# Patient Record
Sex: MALE | Race: WHITE | NOT HISPANIC OR LATINO | Employment: OTHER | URBAN - METROPOLITAN AREA
[De-identification: names, ages, dates, MRNs, and addresses within clinical notes are randomized per-mention and may not be internally consistent; named-entity substitution may affect disease eponyms.]

---

## 2017-04-08 PROBLEM — R07.9 CHEST PAIN: Status: ACTIVE | Noted: 2017-04-08

## 2017-04-09 PROBLEM — I20.0 PROGRESSIVE ANGINA: Status: ACTIVE | Noted: 2017-04-09

## 2017-04-09 PROBLEM — R07.9 CHEST PAIN: Status: ACTIVE | Noted: 2017-04-09

## 2017-04-09 PROBLEM — R07.9 CHEST PAIN: Status: RESOLVED | Noted: 2017-04-08 | Resolved: 2017-04-09

## 2017-09-08 PROBLEM — I20.9 ANGINA, CLASS III: Status: ACTIVE | Noted: 2017-09-08

## 2017-09-09 PROBLEM — I20.9 ANGINA, CLASS III: Status: RESOLVED | Noted: 2017-09-08 | Resolved: 2017-09-09

## 2017-12-19 PROBLEM — R94.39 ABNORMAL CARDIOVASCULAR STRESS TEST: Status: ACTIVE | Noted: 2017-12-19

## 2017-12-19 PROBLEM — I20.9 ANGINA, CLASS III: Status: ACTIVE | Noted: 2017-12-19

## 2017-12-20 PROBLEM — I50.33 ACUTE ON CHRONIC DIASTOLIC CONGESTIVE HEART FAILURE: Status: ACTIVE | Noted: 2017-12-20

## 2018-02-20 PROBLEM — I20.9 ANGINA, CLASS IV: Status: ACTIVE | Noted: 2018-02-20

## 2018-02-20 PROBLEM — I20.0 UNSTABLE ANGINA: Status: ACTIVE | Noted: 2018-02-20

## 2018-05-21 PROBLEM — I20.0 UNSTABLE ANGINA PECTORIS: Status: ACTIVE | Noted: 2018-05-21

## 2018-05-22 PROBLEM — I20.9 ANGINA PECTORIS: Status: ACTIVE | Noted: 2017-04-09

## 2018-06-25 PROBLEM — R07.9 CHEST PAIN: Status: ACTIVE | Noted: 2018-06-25

## 2018-11-12 PROBLEM — R09.02 HYPOXIA: Status: ACTIVE | Noted: 2018-11-12

## 2018-11-13 PROBLEM — I48.91 ATRIAL FIBRILLATION: Status: ACTIVE | Noted: 2018-11-13

## 2018-11-15 PROBLEM — J96.01 ACUTE HYPOXEMIC RESPIRATORY FAILURE: Status: ACTIVE | Noted: 2018-11-12

## 2018-11-16 PROBLEM — S31.109A OPEN WOUND ANTERIOR ABDOMINAL WALL: Status: ACTIVE | Noted: 2018-11-16

## 2018-11-17 PROBLEM — I20.9 ANGINA PECTORIS: Status: RESOLVED | Noted: 2017-04-09 | Resolved: 2018-11-17

## 2018-11-17 PROBLEM — I48.91 ATRIAL FIBRILLATION: Status: RESOLVED | Noted: 2018-11-13 | Resolved: 2018-11-17

## 2018-11-17 PROBLEM — R94.39 ABNORMAL CARDIOVASCULAR STRESS TEST: Status: RESOLVED | Noted: 2017-12-19 | Resolved: 2018-11-17

## 2018-11-17 PROBLEM — J96.01 ACUTE HYPOXEMIC RESPIRATORY FAILURE: Status: RESOLVED | Noted: 2018-11-12 | Resolved: 2018-11-17

## 2019-08-28 ENCOUNTER — HOSPITAL ENCOUNTER (EMERGENCY)
Facility: HOSPITAL | Age: 69
Discharge: HOME | End: 2019-08-28
Attending: EMERGENCY MEDICINE
Payer: MEDICARE

## 2019-08-28 VITALS
OXYGEN SATURATION: 100 % | TEMPERATURE: 98 F | HEART RATE: 54 BPM | WEIGHT: 232.5 LBS | RESPIRATION RATE: 18 BRPM | BODY MASS INDEX: 31.49 KG/M2 | DIASTOLIC BLOOD PRESSURE: 62 MMHG | HEIGHT: 72 IN | SYSTOLIC BLOOD PRESSURE: 148 MMHG

## 2019-08-28 DIAGNOSIS — R00.1 SINUS BRADYCARDIA: ICD-10-CM

## 2019-08-28 DIAGNOSIS — R42 EPISODIC LIGHTHEADEDNESS: Primary | ICD-10-CM

## 2019-08-28 DIAGNOSIS — R42 DIZZINESS: ICD-10-CM

## 2019-08-28 LAB
ALBUMIN SERPL BCP-MCNC: 3.8 G/DL (ref 3.5–5.2)
ALP SERPL-CCNC: 115 U/L (ref 55–135)
ALT SERPL W/O P-5'-P-CCNC: 26 U/L (ref 10–44)
ANION GAP SERPL CALC-SCNC: 10 MMOL/L (ref 8–16)
AST SERPL-CCNC: 27 U/L (ref 10–40)
BASOPHILS # BLD AUTO: 0.02 K/UL (ref 0–0.2)
BASOPHILS NFR BLD: 0.3 % (ref 0–1.9)
BILIRUB SERPL-MCNC: 0.3 MG/DL (ref 0.1–1)
BILIRUB UR QL STRIP: NEGATIVE
BNP SERPL-MCNC: 168 PG/ML (ref 0–99)
BUN SERPL-MCNC: 20 MG/DL (ref 8–23)
CALCIUM SERPL-MCNC: 9.1 MG/DL (ref 8.7–10.5)
CHLORIDE SERPL-SCNC: 106 MMOL/L (ref 95–110)
CK SERPL-CCNC: 247 U/L (ref 20–200)
CLARITY UR: CLEAR
CO2 SERPL-SCNC: 23 MMOL/L (ref 23–29)
COLOR UR: YELLOW
CREAT SERPL-MCNC: 1 MG/DL (ref 0.5–1.4)
DIFFERENTIAL METHOD: ABNORMAL
EOSINOPHIL # BLD AUTO: 0.3 K/UL (ref 0–0.5)
EOSINOPHIL NFR BLD: 4.3 % (ref 0–8)
ERYTHROCYTE [DISTWIDTH] IN BLOOD BY AUTOMATED COUNT: 16.5 % (ref 11.5–14.5)
EST. GFR  (AFRICAN AMERICAN): >60 ML/MIN/1.73 M^2
EST. GFR  (NON AFRICAN AMERICAN): >60 ML/MIN/1.73 M^2
GLUCOSE SERPL-MCNC: 80 MG/DL (ref 70–110)
GLUCOSE UR QL STRIP: NEGATIVE
HCT VFR BLD AUTO: 40.7 % (ref 40–54)
HGB BLD-MCNC: 13.4 G/DL (ref 14–18)
HGB UR QL STRIP: NEGATIVE
KETONES UR QL STRIP: NEGATIVE
LEUKOCYTE ESTERASE UR QL STRIP: NEGATIVE
LYMPHOCYTES # BLD AUTO: 1.1 K/UL (ref 1–4.8)
LYMPHOCYTES NFR BLD: 18.7 % (ref 18–48)
MCH RBC QN AUTO: 26.6 PG (ref 27–31)
MCHC RBC AUTO-ENTMCNC: 32.9 G/DL (ref 32–36)
MCV RBC AUTO: 81 FL (ref 82–98)
MONOCYTES # BLD AUTO: 0.6 K/UL (ref 0.3–1)
MONOCYTES NFR BLD: 10 % (ref 4–15)
NEUTROPHILS # BLD AUTO: 4.1 K/UL (ref 1.8–7.7)
NEUTROPHILS NFR BLD: 66.7 % (ref 38–73)
NITRITE UR QL STRIP: NEGATIVE
PH UR STRIP: 6 [PH] (ref 5–8)
PLATELET # BLD AUTO: 205 K/UL (ref 150–350)
PMV BLD AUTO: 9.4 FL (ref 9.2–12.9)
POTASSIUM SERPL-SCNC: 4.2 MMOL/L (ref 3.5–5.1)
PROT SERPL-MCNC: 7 G/DL (ref 6–8.4)
PROT UR QL STRIP: NEGATIVE
RBC # BLD AUTO: 5.04 M/UL (ref 4.6–6.2)
SODIUM SERPL-SCNC: 139 MMOL/L (ref 136–145)
SP GR UR STRIP: >=1.03 (ref 1–1.03)
TROPONIN I SERPL DL<=0.01 NG/ML-MCNC: 0.01 NG/ML (ref 0–0.03)
URN SPEC COLLECT METH UR: ABNORMAL
UROBILINOGEN UR STRIP-ACNC: NEGATIVE EU/DL
WBC # BLD AUTO: 6.1 K/UL (ref 3.9–12.7)

## 2019-08-28 PROCEDURE — 93005 ELECTROCARDIOGRAM TRACING: CPT

## 2019-08-28 PROCEDURE — 82550 ASSAY OF CK (CPK): CPT

## 2019-08-28 PROCEDURE — 99285 EMERGENCY DEPT VISIT HI MDM: CPT | Mod: 25

## 2019-08-28 PROCEDURE — 83880 ASSAY OF NATRIURETIC PEPTIDE: CPT

## 2019-08-28 PROCEDURE — 80053 COMPREHEN METABOLIC PANEL: CPT

## 2019-08-28 PROCEDURE — 85025 COMPLETE CBC W/AUTO DIFF WBC: CPT

## 2019-08-28 PROCEDURE — 84484 ASSAY OF TROPONIN QUANT: CPT

## 2019-08-28 PROCEDURE — 96360 HYDRATION IV INFUSION INIT: CPT

## 2019-08-28 PROCEDURE — 93010 ELECTROCARDIOGRAM REPORT: CPT | Mod: ,,, | Performed by: INTERNAL MEDICINE

## 2019-08-28 PROCEDURE — 81003 URINALYSIS AUTO W/O SCOPE: CPT

## 2019-08-28 PROCEDURE — 63600175 PHARM REV CODE 636 W HCPCS: Performed by: EMERGENCY MEDICINE

## 2019-08-28 PROCEDURE — 93010 EKG 12-LEAD: ICD-10-PCS | Mod: ,,, | Performed by: INTERNAL MEDICINE

## 2019-08-28 RX ADMIN — SODIUM CHLORIDE 500 ML: 0.9 INJECTION, SOLUTION INTRAVENOUS at 06:08

## 2019-08-28 NOTE — ED NOTES
Pt lying in bed comfortably. AAO x 4. Resp even and unlabored with equal chest rise and fall. Skin warm and dry. 20G PIV noted to R forearm. Flushes well, drg CDI. Side rails up x 2. Call light within reach. No distress noted. Pt denies any needs or assist at this time.

## 2019-08-28 NOTE — ED NOTES
Pt presents to ED today with c/o dizziness x3 hours. Pt describes symptoms as feeling lightheaded +spinning when he is lying down. Associated symptoms include nausea and HA. Patient denies any chest pain, fever, chills, sore throat, cough, diarrhea, SOB, HA, syncope, weakness, numbness, seizures, and all other sxs at this time.    Patient identifiers verified and correct for Laz Rj.    Level of Consciousness: The patient is awake, alert and aware of environment with an appropriate affect, the patient is oriented x 3 and speaking appropriately.  Appearance: Patient resting comfortably and in no acute distress, patient is clean and well groomed, patient's clothing is properly fastened.  Skin: The skin is warm and dry, color consistent with ethnicity, patient has normal skin turgor and moist mucus membranes, skin intact, no breakdown or bruising noted.  Musculoskeletal: Moves all extremities well in full range of motion. No obvious deformities or swelling noted. +generalized weakness  Respiratory: Airway open and patent, respirations spontaneous, even and unlabored. No accessory muscles in use. Breath sounds clear & equal  Cardiac: Patient is bradycardic, no periphreal edema noted, capillary refill < 3 seconds. good pulses palpated peripherally. Denies chest pain  Abdomen: Soft, non-tender to palpation. No distention noted. +nausea  Neurologic: PERRLA, face exhibits symmetrical expression, hand grasps equal and even bilaterally, reports normal sensation to all extremities and face.  Psychosocial: Normal affect. Good eye contact. Appropriate in content.  HEENT: +dizziness while laying flat +HA    Patient verbalized understanding of status and plan of care. Patient changed into hospital gown with assistance. Side rails up x 2, call light in reach, bed low and locked. Cardiac monitor applied to patient; alarms on & audible. WCTM.

## 2019-08-28 NOTE — ED PROVIDER NOTES
"SCRIBE #1 NOTE: I, Sb Jeffrey, am scribing for, and in the presence of, Sanna Martinez MD. I have scribed the entire note.       History     Chief Complaint   Patient presents with    Dizziness     Light-headed x3 hours. +HA/nausea. Triple bypass 10 months ago.     Review of patient's allergies indicates:  No Known Allergies      History of Present Illness     HPI    8/28/2019, 4:52 PM  History obtained from the patient      History of Present Illness: Laz De La Rosa is a 69 y.o. male patient with a PMHx of HTN who presents to the Emergency Department for evaluation of light-headedness which onset gradually 3 hours ago at home after eating lunch out. Symptoms are constant and moderate in severity. No mitigating or exacerbating factors reported. No associated sxs reported. Patient denies any SOB, abd pain, n/v, extremity weakness, dizziness, facial asymmetry, HA, speech difficulty, and all other sxs at this time. No prior Tx reported. Pt reports an episode 4 years ago with similar sxs as a result of low blood pressure. Pt denies any recent changes to his medications and any new activities. Pt states that he "sweat a lot" yesterday after working outside. No further complaints or concerns at this time.     Discrepancy noted between triage note and physician interview- pt denied any nausea and HA in physician interview.      Arrival mode: Personal vehicle     PCP: Vance Cleveland MD        Past Medical History:  Past Medical History:   Diagnosis Date    Anemia     BPH (benign prostatic hyperplasia)     Carotid artery disease     Coronary artery disease     Depression     GERD (gastroesophageal reflux disease)     Hemorrhoid     Hernia of abdominal wall     [place] (hard of hearing)     Hypercholesteremia     Hypertension     Wears hearing aid in both ears        Past Surgical History:  Past Surgical History:   Procedure Laterality Date    ANGIOGRAM, CORONARY ARTERY N/A 10/31/2018    Performed by " Shaka Sandoval MD at [facility] C[facility]    ANGIOGRAM-CORONARY N/A 4/10/2017    Performed by Viktor Villatoro MD at [facility] CATH    CARDIAC CATHETERIZATION      CARDIAC SURGERY      CORONARY ANGIOPLASTY      s/p PCI D1, pLAD, mLCx. PTCA D1 ISR    CORONARY ARTERY BYPASS GRAFT (CABG) N/A 11/7/2018    Performed by Andre Perez MD at [facility]    CORONARY STENT PLACEMENT      16 STENTS    GANGLION CYST EXCISION      HEART CATH-LEFT Right 9/8/2017    Performed [facility] Sewell [facility] at [facility]    HEART CATH-LEFT Left 11/1/2016    Per[facility]ell [facility] at [facility]    HEART CATH-LEFT Right 10/25/2016    Performed by [facility]ell [facility] at [facility]    HERNIA REPAIR      Left heart cath Left 6/26/2018    Performed by Viktor Villatoro MD at [facility] CATH    Left heart cath Left 5/22/2018    Performed by Sahil Shaikh MD at [facility] CATH    Left heart cath Left 2/20/2018    Performed by Michael Villatoro MD at [facility] CATH    Left heart cath Right 12/19/2017    Performed by [facility]ell MD at [facility] C[facility]    SURGICAL PROCUREMENT, VEIN, ENDOSCOPIC Right 11/7/2018    Performed by Andre Perez MD at [facility] OR    TONSILLECTOMY           Family History:  Family History   Problem Relation Age of Onset    Heart disease Father         MI    Heart disease Sister         ENLARGED HEART       Social History:  Social History     Tobacco Use    Smoking status: Former Smoker    Smokeless tobacco: Never Used    Tobacco comment: Quit x 40 years ago   Substance and Sexual Activity    Alcohol use: No     Comment: socially    Drug use: No    Sexual activity: Never        Review of Systems     Review of Systems   Constitutional: Negative for fever.   HENT: Negative for sore throat.    Respiratory: Negative for shortness of breath.    Cardiovascular: Negative for chest pain.   Gastrointestinal: Negative for abdominal pain, nausea and vomiting.   Genitourinary: Negative for dysuria.    Musculoskeletal: Negative for back pain.   Skin: Negative for rash.   Neurological: Positive for light-headedness. Negative for dizziness, facial asymmetry, speech difficulty, weakness and headaches.   Hematological: Does not bruise/bleed easily.   All other systems reviewed and are negative.       Physical Exam     Initial Vitals [08/28/19 1642]   BP Pulse Resp Temp SpO2   129/72 (!) 57 16 98.6 °F (37 °C) 96 %      MAP       --          Physical Exam  Nursing Notes and Vital Signs Reviewed.  Constitutional: Patient is in no acute distress. Well-developed and well-nourished.  Head: Atraumatic. Normocephalic.  Eyes: PERRL. EOM intact. Conjunctivae are not pale. No scleral icterus.  ENT: Mucous membranes are moist. Oropharynx is clear and symmetric.    Neck: Supple. Full ROM. No lymphadenopathy.  Cardiovascular: Regular rate. Regular rhythm. No murmurs, rubs, or gallops. Distal pulses are 2+ and symmetric.  Pulmonary/Chest: No respiratory distress. Clear to auscultation bilaterally. No wheezing or rales.  Abdominal: Soft and non-distended.  There is no tenderness.  No rebound, guarding, or rigidity. Good bowel sounds.  Genitourinary: No CVA tenderness  Musculoskeletal: Moves all extremities. No obvious deformities. No edema. No calf tenderness.  Skin: Warm and dry.  Neurological: Patient is alert and oriented to person, place and time. Pupils ERRL and EOM normal. Cranial nerves II-XII are intact. Strength is full bilaterally; it is equal and 5/5 in bilateral upper and lower extremities. There is no pronator drift of outstretched arms. Light touch sense is intact. Speech is clear and normal. No acute focal neurological deficits noted.  Psychiatric: Normal affect. Good eye contact. Appropriate in content.     ED Course   Procedures  ED Vital Signs:  Vitals:    08/28/19 1641 08/28/19 1642 08/28/19 1659 08/28/19 1702   BP:  129/72  135/71   Pulse:  (!) 57 (!) 56 (!) 53   Resp:  16     Temp:  98.6 °F (37 °C)     TempSrc:   Oral     SpO2:  96%     Weight: 105.4 kg (232 lb 7.6 oz)      Height: 6' (1.829 m)       08/28/19 1703 08/28/19 1704 08/28/19 1706 08/28/19 1801   BP: 136/78 (!) 147/87 (!) 147/87 123/66   Pulse: 60 62 62 (!) 51   Resp:   16 16   Temp:       TempSrc:       SpO2:   97% 96%   Weight:       Height:           Abnormal Lab Results:  Labs Reviewed   CBC W/ AUTO DIFFERENTIAL - Abnormal; Notable for the following components:       Result Value    Hemoglobin 13.4 (*)     Mean Corpuscular Volume 81 (*)     Mean Corpuscular Hemoglobin 26.6 (*)     RDW 16.5 (*)     All other components within normal limits   B-TYPE NATRIURETIC PEPTIDE - Abnormal; Notable for the following components:     (*)     All other components within normal limits   URINALYSIS, REFLEX TO URINE CULTURE - Abnormal; Notable for the following components:    Specific Gravity, UA >=1.030 (*)     All other components within normal limits    Narrative:     Preferred Collection Type->Urine, Clean Catch   CK - Abnormal; Notable for the following components:     (*)     All other components within normal limits   COMPREHENSIVE METABOLIC PANEL   TROPONIN I        All Lab Results:  Results for orders placed or performed during the hospital encounter of 08/28/19   CBC auto differential   Result Value Ref Range    WBC 6.10 3.90 - 12.70 K/uL    RBC 5.04 4.60 - 6.20 M/uL    Hemoglobin 13.4 (L) 14.0 - 18.0 g/dL    Hematocrit 40.7 40.0 - 54.0 %    Mean Corpuscular Volume 81 (L) 82 - 98 fL    Mean Corpuscular Hemoglobin 26.6 (L) 27.0 - 31.0 pg    Mean Corpuscular Hemoglobin Conc 32.9 32.0 - 36.0 g/dL    RDW 16.5 (H) 11.5 - 14.5 %    Platelets 205 150 - 350 K/uL    MPV 9.4 9.2 - 12.9 fL    Gran # (ANC) 4.1 1.8 - 7.7 K/uL    Lymph # 1.1 1.0 - 4.8 K/uL    Mono # 0.6 0.3 - 1.0 K/uL    Eos # 0.3 0.0 - 0.5 K/uL    Baso # 0.02 0.00 - 0.20 K/uL    Gran% 66.7 38.0 - 73.0 %    Lymph% 18.7 18.0 - 48.0 %    Mono% 10.0 4.0 - 15.0 %    Eosinophil% 4.3 0.0 - 8.0 %     Basophil% 0.3 0.0 - 1.9 %    Differential Method Automated    Comprehensive metabolic panel   Result Value Ref Range    Sodium 139 136 - 145 mmol/L    Potassium 4.2 3.5 - 5.1 mmol/L    Chloride 106 95 - 110 mmol/L    CO2 23 23 - 29 mmol/L    Glucose 80 70 - 110 mg/dL    BUN, Bld 20 8 - 23 mg/dL    Creatinine 1.0 0.5 - 1.4 mg/dL    Calcium 9.1 8.7 - 10.5 mg/dL    Total Protein 7.0 6.0 - 8.4 g/dL    Albumin 3.8 3.5 - 5.2 g/dL    Total Bilirubin 0.3 0.1 - 1.0 mg/dL    Alkaline Phosphatase 115 55 - 135 U/L    AST 27 10 - 40 U/L    ALT 26 10 - 44 U/L    Anion Gap 10 8 - 16 mmol/L    eGFR if African American >60 >60 mL/min/1.73 m^2    eGFR if non African American >60 >60 mL/min/1.73 m^2   Troponin I #1   Result Value Ref Range    Troponin I 0.008 0.000 - 0.026 ng/mL   B-Type natriuretic peptide (BNP)   Result Value Ref Range     (H) 0 - 99 pg/mL   Urinalysis, Reflex to Urine Culture Urine, Clean Catch   Result Value Ref Range    Specimen UA Urine, Clean Catch     Color, UA Yellow Yellow, Straw, Esme    Appearance, UA Clear Clear    pH, UA 6.0 5.0 - 8.0    Specific Gravity, UA >=1.030 (A) 1.005 - 1.030    Protein, UA Negative Negative    Glucose, UA Negative Negative    Ketones, UA Negative Negative    Bilirubin (UA) Negative Negative    Occult Blood UA Negative Negative    Nitrite, UA Negative Negative    Urobilinogen, UA Negative <2.0 EU/dL    Leukocytes, UA Negative Negative   CPK   Result Value Ref Range     (H) 20 - 200 U/L         Imaging Results:  Imaging Results          CT Head Without Contrast (Final result)  Result time 08/28/19 18:53:03    Final result by INOCENTE Johnson Sr., MD (08/28/19 18:53:03)                 Impression:      1. No intracranial abnormality  2. There is a 5 mm polyp in the right maxillary sinus. There is a 3 mm polyp versus mucous retention cyst in the left ethmoidal sinus.  All CT scans at this facility use dose modulation, iterative reconstruction, and/or weight base  dosing when appropriate to reduce radiation dose when appropriate to reduce radiation dose to as low as reasonably achievable.      Electronically signed by: Tommy Johnson MD  Date:    08/28/2019  Time:    18:53             Narrative:    EXAMINATION:  CT HEAD WITHOUT CONTRAST    CLINICAL HISTORY:  Dizziness;    TECHNIQUE:  Standard brain CT protocol without IV contrast was performed.    COMPARISON:  10/20/2014    FINDINGS:  The ventricles have a normal size, position, and appearance. There is no abnormal intracranial mass or intracranial hemorrhage. There is no skull fracture.  There is a 5 mm polyp in the right maxillary sinus.  There is a 3 mm polyp versus mucous retention cyst in the left ethmoidal sinus.                               X-Ray Chest PA And Lateral (Final result)  Result time 08/28/19 17:24:44    Final result by INOCENTE Johnson Sr., MD (08/28/19 17:24:44)                 Impression:      1. The lungs are clear.  2. Surgical changes  .      Electronically signed by: Tommy Johnson MD  Date:    08/28/2019  Time:    17:24             Narrative:    EXAMINATION:  XR CHEST PA AND LATERAL    CLINICAL HISTORY:  Chest Pain;    COMPARISON:  01/03/2019    FINDINGS:  There are multiple sternotomy wires in place.  There are multiple surgical clips in the mediastinum.  The size and contour of the heart are normal. The lungs are clear. There is no pneumothorax or pleural effusion.                                 The EKG was ordered, reviewed, and independently interpreted by the ED provider.  Interpretation time: 1643  Rate: 56 BPM  Rhythm: sinus bradycardia  Interpretation: RBBB. T wave abnormality, consider inferolateral ischemia. No STEMI.           The Emergency Provider reviewed the vital signs and test results, which are outlined above.     ED Discussion     7:00 PM: Reassessed pt at this time.  Pt states his condition has improved at this time. Discussed with pt all pertinent ED information and results.  Discussed pt dx and plan of tx. Gave pt all f/u and return to the ED instructions. All questions and concerns were addressed at this time. Pt expresses understanding of information and instructions, and is comfortable with plan to discharge. Pt is stable for discharge.    I discussed with patient and/or family/caretaker that evaluation in the ED does not suggest any emergent or life threatening medical conditions requiring immediate intervention beyond what was provided in the ED, and I believe patient is safe for discharge.  Regardless, an unremarkable evaluation in the ED does not preclude the development or presence of a serious of life threatening condition. As such, patient was instructed to return immediately for any worsening or change in current symptoms.    I discussed with patient and/or family/caretaker that negative X-ray does not rule out occult fracture or other soft tissue injury.  Persistent pain greater than 7-10 days or increased pain requires follow up, specifically with orthopedics.              Medical Decision Making:   Clinical Tests:   Lab Tests: Ordered and Reviewed  Radiological Study: Ordered and Reviewed  Medical Tests: Reviewed and Ordered           ED Medication(s):  Medications   sodium chloride 0.9% bolus 500 mL (500 mLs Intravenous New Bag 8/28/19 1055)       New Prescriptions    No medications on file        Medication List      ASK your doctor about these medications    amiodarone 200 MG Tab  Commonly known as:  PACERONE     amLODIPine 5 MG tablet  Commonly known as:  NORVASC     aspirin 81 MG EC tablet  Commonly known as:  ECOTRIN     atorvastatin 80 MG tablet  Commonly known as:  LIPITOR  Take 1 tablet (80 mg total) by mouth every evening.     buPROPion 300 MG 24 hr tablet  Commonly known as:  WELLBUTRIN XL     clopidogrel 75 mg tablet  Commonly known as:  PLAVIX     clorazepate 7.5 MG Tab  Commonly known as:  TRANXENE     CO Q-10 100 mg capsule  Generic drug:  coenzyme Q10      finasteride 5 mg tablet  Commonly known as:  PROSCAR     furosemide 40 MG tablet  Commonly known as:  LASIX     metoprolol succinate 50 MG 24 hr tablet  Commonly known as:  TOPROL-XL     multivitamin per tablet  Commonly known as:  THERAGRAN     nitroGLYCERIN 0.4 MG/DOSE TL SPRY 400 mcg/spray spray  Commonly known as:  NITROLINGUAL     olmesartan 40 MG tablet  Commonly known as:  BENICAR     omega 3-dha-epa-fish oil 1,000 mg (120 mg-180 mg) Cap     omeprazole 40 MG capsule  Commonly known as:  PRILOSEC     [employer] 1.5 billion cell Cap  Generic drug:  L. gasseri-B. bifidum-B longum     potassium chloride SA 10 MEQ tablet  Commonly known as:  K-DUR,KLOR-CON     zinc gluconate 50 mg tablet              Follow-up Information     Vance Cleveland MD. Schedule an appointment as soon as possible for a visit in 1 day.    Specialty:  Family Medicine  Why:  Return to the Emergency Room, If symptoms worsen  Contact information:  [address] [place] [id number]  [phone]                       Scribe Attestation:   Scribe #1: I performed the above scribed service and the documentation accurately describes the services I performed. I attest to the accuracy of the note.     Attending:   Physician Attestation Statement for Scribe #1: I, Sanna Martinez MD, personally performed the services described in this documentation, as scribed by Sb Murphy, in my presence, and it is both accurate and complete.           Clinical Impression       ICD-10-CM ICD-9-CM   1. Episodic lightheadedness R42 780.4   2. Dizziness R42 780.4   3. Sinus bradycardia R00.1 427.89       Disposition:   Disposition: Discharged  Condition: Stable         Sanna Martinez MD  08/28/19 2014

## 2019-11-04 PROBLEM — R31.0 GROSS HEMATURIA: Status: ACTIVE | Noted: 2019-11-04

## 2019-12-05 PROBLEM — G56.02 CARPAL TUNNEL SYNDROME ON LEFT: Status: ACTIVE | Noted: 2019-12-05

## 2020-07-22 PROBLEM — N32.89 BLADDER MASS: Status: ACTIVE | Noted: 2020-07-22

## 2020-08-13 PROBLEM — I25.119 CORONARY ARTERY DISEASE INVOLVING NATIVE CORONARY ARTERY OF NATIVE HEART WITH ANGINA PECTORIS: Status: ACTIVE | Noted: 2020-08-13

## 2020-12-17 ENCOUNTER — HOSPITAL ENCOUNTER (OUTPATIENT)
Dept: SLEEP MEDICINE | Facility: HOSPITAL | Age: 70
Discharge: HOME | End: 2020-12-17
Attending: INTERNAL MEDICINE
Payer: MEDICARE

## 2020-12-17 DIAGNOSIS — G47.33 OBSTRUCTIVE SLEEP APNEA (ADULT) (PEDIATRIC): ICD-10-CM

## 2020-12-17 PROCEDURE — 95810 POLYSOM 6/> YRS 4/> PARAM: CPT

## 2020-12-17 PROCEDURE — 95810 POLYSOM 6/> YRS 4/> PARAM: CPT | Mod: 26,,, | Performed by: INTERNAL MEDICINE

## 2020-12-17 PROCEDURE — 95810 PR POLYSOMNOGRAPHY, 4 OR MORE: ICD-10-PCS | Mod: 26,,, | Performed by: INTERNAL MEDICINE

## 2021-01-14 DIAGNOSIS — Z11.59 SPECIAL SCREENING EXAMINATION FOR UNSPECIFIED VIRAL DISEASE: ICD-10-CM

## 2021-01-17 ENCOUNTER — HOSPITAL ENCOUNTER (OUTPATIENT)
Dept: PREADMISSION TESTING | Facility: HOSPITAL | Age: 71
Discharge: HOME | End: 2021-01-17
Attending: INTERNAL MEDICINE
Payer: MEDICARE

## 2021-01-17 DIAGNOSIS — Z11.59 SPECIAL SCREENING EXAMINATION FOR UNSPECIFIED VIRAL DISEASE: ICD-10-CM

## 2021-01-17 PROCEDURE — U0003 INFECTIOUS AGENT DETECTION BY NUCLEIC ACID (DNA OR RNA); SEVERE ACUTE RESPIRATORY SYNDROME CORONAVIRUS 2 (SARS-COV-2) (CORONAVIRUS DISEASE [COVID-19]), AMPLIFIED PROBE TECHNIQUE, MAKING USE OF HIGH THROUGHPUT TECHNOLOGIES AS DESCRIBED BY CMS-2020-01-R: HCPCS

## 2021-01-18 LAB — SARS-COV-2 RNA RESP QL NAA+PROBE: NOT DETECTED

## 2021-01-20 ENCOUNTER — HOSPITAL ENCOUNTER (OUTPATIENT)
Dept: SLEEP MEDICINE | Facility: HOSPITAL | Age: 71
Discharge: HOME | End: 2021-01-20
Attending: INTERNAL MEDICINE
Payer: MEDICARE

## 2021-01-20 DIAGNOSIS — G47.33 OBSTRUCTIVE SLEEP APNEA (ADULT) (PEDIATRIC): ICD-10-CM

## 2021-01-20 PROCEDURE — 95811 POLYSOM 6/>YRS CPAP 4/> PARM: CPT | Mod: 26,,, | Performed by: INTERNAL MEDICINE

## 2021-01-20 PROCEDURE — 95811 PR POLYSOMNOGRAPHY W/CPAP: ICD-10-PCS | Mod: 26,,, | Performed by: INTERNAL MEDICINE

## 2021-01-20 PROCEDURE — 95811 POLYSOM 6/>YRS CPAP 4/> PARM: CPT
